# Patient Record
Sex: FEMALE | Race: WHITE | NOT HISPANIC OR LATINO | Employment: FULL TIME | URBAN - METROPOLITAN AREA
[De-identification: names, ages, dates, MRNs, and addresses within clinical notes are randomized per-mention and may not be internally consistent; named-entity substitution may affect disease eponyms.]

---

## 2020-01-02 ENCOUNTER — HISTORICAL (OUTPATIENT)
Dept: ADMINISTRATIVE | Facility: HOSPITAL | Age: 45
End: 2020-01-02

## 2021-06-08 ENCOUNTER — HISTORICAL (OUTPATIENT)
Dept: ADMINISTRATIVE | Facility: HOSPITAL | Age: 46
End: 2021-06-08

## 2022-04-07 ENCOUNTER — HISTORICAL (OUTPATIENT)
Dept: ADMINISTRATIVE | Facility: HOSPITAL | Age: 47
End: 2022-04-07

## 2022-04-23 VITALS
SYSTOLIC BLOOD PRESSURE: 110 MMHG | HEIGHT: 65 IN | OXYGEN SATURATION: 99 % | BODY MASS INDEX: 20.28 KG/M2 | DIASTOLIC BLOOD PRESSURE: 50 MMHG | WEIGHT: 121.69 LBS

## 2022-05-01 NOTE — HISTORICAL [facility] CERNER
This is a historical note converted from Rylie. Formatting and pictures may have been removed.  Please reference Rylie for original formatting and attached multimedia. Chief Complaint  Right knee pain. ?She is currently training for a marathon and has been in pain for ~ 1 week.  History of Present Illness  Patient presents with a chief complaint of right knee pain.  Patient was seen and evaluated for right knee pain in January 2020.? At that time,?she was training for marathon.? She appeared to have bursitis/tendinitis at that time.  When COVID hit, she did not run or exercise for a while. She resumed training in May. She has been building herself up; she ran 3 miles Saturday and she could barely get up on Sunday. She has been taking Advil and Aleve. She has been also doing sprints and ankle weights. She reports a throbbing pain; if she moves a certain way, she experiences a sharp pain. Her knee has not been swollen. She has a dull throbbing pain even with sitting or lying down. It pops infrequently. It does not lock or give out.  Review of Systems  See HPI.  Physical Exam  Vitals & Measurements  T:?36.5? ?C (Oral)? HR:?63(Peripheral)? BP:?110/50? SpO2:?99%?  HT:?165.00?cm? WT:?55.200?kg? BMI:?20.28? LMP:?05/25/2021 00:00 CDT?  General:?She is a well-developed well-nourished white female no apparent distress.? She can transfer without difficulty. ?She has a normal gait.  Right knee:?Normal in appearance,?no swelling, no effusion, no erythema warmth,?she is tender over her?distal?medial and lateral?joint lines?extending anteriorly?to her?patella area.? Palpation of her patella is uncomfortable.? Her patella appears to track normally.? I do not appreciate any crepitus.? She can fully extend?and flex her knee.  Assessment/Plan  2.?Right knee pain?M25.561  ?Patient advised to decrease her volume of training. ?Patient has increased?her mileage?and is also doing sprints?and ankle weights.  I believe that the volume  retraining?induces?her to have a tendinitis/bursitis.  She has no evidence of ligament instability on examination.  X-rays pending;?they appear to be normal.  Start diclofenac 50 mg: Take 1 tablet daily?with food.  If symptoms persist after 2 weeks,?we will consider?PT?and/or orthopedic evaluation.  Ordered:  Clinic Follow-up PRN, 06/08/21 11:15:00 CDT, [facility] - [facility], Future Order  Office/Outpatient Visit Level 3 Established [id number] PC, Right knee pain, [facility], 06/08/21 11:15:00 CDT  XR Knee Right 1 or 2 Views, Routine, 06/08/21 10:51:00 CDT, Other (please specify), None, Ambulatory, Rad Type, Right knee pain, Not Scheduled, [facility], 06/08/21 10:51:00 CDT  ?  Orders:  diclofenac, 50 mg = 1 tab(s), Oral, BID, # 30 tab(s), 1 Refill(s), Pharmacy: [facility]/pharmacy #[id number], 165, cm, Height/Length Dosing, 06/08/21 10:49:00 CDT, 55.2, kg, Weight Dosing, 06/08/21 10:49:00 CDT  Referrals  Clinic Follow-up PRN, 06/08/21 11:15:00 CDT, [facility] AMB - [facility], Future Order   Problem List/Past Medical History  Ongoing  No chronic problems  Historical  No qualifying data  Procedure/Surgical History  Tonsillectomy  Tubal ligation  [place] teeth extracted   Medications  diclofenac potassium 50 mg oral tablet, 50 mg= 1 tab(s), Oral, BID, 1 refills  Allergies  No Known Medication Allergies  Social History  Abuse/Neglect  No, 01/02/2020  Alcohol  Never, 01/02/2020  Employment/School  Employed, Work/School description: ., 01/02/2020  Exercise  Exercise frequency: 5-6 times/week. Exercise type: Running., 01/02/2020  Home/Environment  Lives with Children, Spouse. Living situation: Home/Independent., 01/02/2020  Nutrition/Health  Regular, Good, 01/02/2020  Substance Use  Never, 01/02/2020  Tobacco  Never (less than 100 in lifetime), N/A, 01/02/2020  Family History  Alcoholism: Sister.  Bipolar disorder: Sister.  Smoker: Father.  Mother: History is negative  Health Maintenance  Health Maintenance  ???Pending?(in the  next year)  ??? ??OverDue  ??? ? ? ?Influenza Vaccine due??10/01/20??and every 1??day(s)  ??? ? ? ?Alcohol Misuse Screening due??01/02/21??and every 1??year(s)  ??? ??Due?  ??? ? ? ?ADL Screening due??06/08/21??and every 1??year(s)  ??? ? ? ?Depression Screening due??06/08/21??Unknown Frequency  ??? ? ? ?Diabetes Screening due??06/08/21??Unknown Frequency  ??? ? ? ?Lipid Screening due??06/08/21??Unknown Frequency  ??? ? ? ?Tetanus Vaccine due??06/08/21??and every 10??year(s)  ??? ??Due In Future?  ??? ? ? ?Obesity Screening not due until??01/01/22??and every 1??year(s)  ???Satisfied?(in the past 1 year)  ??? ??Satisfied?  ??? ? ? ?Blood Pressure Screening on??06/08/21.??Satisfied by Danna Kurtz LPN  ??? ? ? ?Body Mass Index Check on??06/08/21.??Satisfied by Danna Kurtz LPN  ??? ? ? ?Cervical Cancer Screening on??01/21/21.??Satisfied by Mitch Benson  ??? ? ? ?Obesity Screening on??06/08/21.??Satisfied by Danna Kurtz LPN  ?      X-rays of right knee are normal.

## 2022-05-01 NOTE — HISTORICAL [facility] CERNER
This is a historical note converted from Rylie. Formatting and pictures may have been removed.  Please reference Rylie for original formatting and attached multimedia. Chief Complaint  TRAINING FOR MARATHONS. ?SHE RECENTLY RAN A HALF MARATHON AND AROUND THE 12TH MILE, HER RIGHT KNEE BAGAN TO HURT. ?THIS HAS BECOME CONTIUOUS PAIN.  History of Present Illness  Patient presents for evaluation of right knee pain.? Her knee began to hurt while she was running a half marathon the weekend before Christmas. Unexpectedly, the race course had hills. After the race, she could barely bend her knee. She has been running with a sleeve. She is training to do a full marathon in March. She runs 20-25 miles?a week.? She runs in her neighborhood. She recently changed her shoes. She only has had knee issues when she did a race previously. She takes 8-10 Tylenol a day which helps her. She reports she does not have much arch. She denies any swelling. Her pain is continuous.  Review of Systems  See HPI.  Physical Exam  Vitals & Measurements  HR:?84(Peripheral)? BP:?110/70?  HT:?165?cm? WT:?55.4?kg? BMI:?20.35? LMP:?12/22/2019 00:00 CST?  General: She is well-developed well-nourished white female in no apparent distress. ?She walks a?with normal gait.  Right knee:?Normal in appearance, no swelling,?no effusion, no skin changes,?able to fully extend and flex. ?She has discomfort with extension.? Negative Lachmans and negative McMurrays.? No evidence of knee instability noted.? She has discomfort over?her pes anserine bursa and the patella?tendon.  ?  Assessment/Plan  1.?Patellar tendinitis?M76.50  ?Diclofenac 50 mg twice daily.  Decrease running mileage.? Patient advised?her tendinitis and bursitis?are secondary to?her running. ?These conditions are related to?over usage or repetitive activities.  She is to limit her Tylenol intake to?3000 mg a day.  If her symptoms persist, she may need physical therapy or?be evaluated by an  orthopedist.  Ordered:  Office/Outpatient Visit Level 3 Established [id number] PC, Patellar tendinitis  Pes anserine bursitis, [facility], 01/02/20 11:00:00 CST  ?  2.?Pes anserine bursitis?M70.50  ?See #1.  Ordered:  Office/Outpatient Visit Level 3 Established [id number] PC, Patellar tendinitis  Pes anserine bursitis, [facility], 01/02/20 11:00:00 CST  ?  Orders:  diclofenac, 50 mg = 1 tab(s), Oral, BID, # 30 tab(s), 1 Refill(s), Pharmacy: [facility]/pharmacy #[id number], 165, cm, Height/Length Dosing, 01/02/20 10:17:00 CST, 55.4, kg, Weight Dosing, 01/02/20 10:17:00 CST  Clinic Follow-up PRN, 01/02/20 11:01:00 CST, [facility]K AMB - [facility], Future Order  XR Knee Right 1 or 2 Views, Routine, 01/02/20 10:26:00 CST, Other (please specify), None, Ambulatory, Rad Type, Knee pain, [facility], 01/02/20 10:26:00 CST  Referrals  Clinic Follow-up PRN, 01/02/20 11:01:00 CST, [facility] AMB - [facility], Future Order   Problem List/Past Medical History  Ongoing  No chronic problems  Historical  No qualifying data  Procedure/Surgical History  Tonsillectomy  Tubal ligation  [place] teeth extracted   Medications  diclofenac sodium 50 mg oral delayed release tablet, 50 mg= 1 tab(s), Oral, BID, 1 refills  Allergies  No Known Medication Allergies  Social History  Abuse/Neglect  No, 01/02/2020  Alcohol  Never, 01/02/2020  Employment/School  Employed, Work/School description: ., 01/02/2020  Exercise  Exercise frequency: 5-6 times/week. Exercise type: Running., 01/02/2020  Home/Environment  Lives with Children, Spouse. Living situation: Home/Independent., 01/02/2020  Nutrition/Health  Regular, Good, 01/02/2020  Substance Use  Never, 01/02/2020  Tobacco  Never (less than 100 in lifetime), N/A, 01/02/2020  Family History  Alcoholism: Sister.  Bipolar disorder: Sister.  Smoker: Father.  Mother: History is negative  Health Maintenance  Health Maintenance  ???Pending?(in the next year)  ??? ??Due?  ??? ? ? ?Alcohol Misuse Screening  due??01/01/20??and every 1??year(s)  ??? ? ? ?ADL Screening due??01/02/20??and every 1??year(s)  ??? ? ? ?Tetanus Vaccine due??01/02/20??and every 10??year(s)  ??? ??Due In Future?  ??? ? ? ?Obesity Screening not due until??01/01/21??and every 1??year(s)  ???Satisfied?(in the past 1 year)  ??? ??Satisfied?  ??? ? ? ?Blood Pressure Screening on??01/02/20.??Satisfied by Danna Kurtz LPN  ??? ? ? ?Body Mass Index Check on??01/02/20.??Satisfied by Danna Kurtz LPN  ??? ? ? ?Influenza Vaccine on??01/02/20.??Satisfied by Danna Kurtz LPN  ??? ? ? ?Obesity Screening on??01/02/20.??Satisfied by Danna Kurtz LPN  ?      X-rays of right knee are normal.

## 2023-08-17 PROBLEM — Z86.69 HX OF MIGRAINE HEADACHES: Status: ACTIVE | Noted: 2023-08-17

## 2023-08-17 PROBLEM — G44.52 NEW DAILY PERSISTENT HEADACHE: Status: ACTIVE | Noted: 2023-08-17

## 2023-09-20 PROBLEM — F32.0 CURRENT MILD EPISODE OF MAJOR DEPRESSIVE DISORDER WITHOUT PRIOR EPISODE: Status: ACTIVE | Noted: 2023-09-20

## 2023-09-20 PROBLEM — F41.9 ANXIETY: Status: ACTIVE | Noted: 2023-09-20

## 2023-09-20 PROBLEM — Z28.21 IMMUNIZATION REFUSED: Status: ACTIVE | Noted: 2023-09-20
